# Patient Record
Sex: FEMALE | Race: WHITE | NOT HISPANIC OR LATINO | Employment: UNEMPLOYED | URBAN - METROPOLITAN AREA
[De-identification: names, ages, dates, MRNs, and addresses within clinical notes are randomized per-mention and may not be internally consistent; named-entity substitution may affect disease eponyms.]

---

## 2016-05-19 LAB
ALBUMIN SERPL-MCNC: 4.6 G/DL (ref 3.4–5)
ALP SERPL-CCNC: 71 U/L (ref 0–116)
ALT SERPL-CCNC: 30 U/L (ref 0–78)
ANION GAP SERPL CALCULATED.3IONS-SCNC: NORMAL MMOL/L
AST SERPL-CCNC: 26 U/L (ref 0–37)
BILIRUB SERPL-MCNC: 0.84 MG/DL (ref 0.2–1)
BUN SERPL-MCNC: 18 MG/DL (ref 7–18)
CALCIUM SERPL-MCNC: 9.1 MG/DL (ref 8.5–10.1)
CHLORIDE SERPLBLD-SCNC: 104 MMOL/L (ref 98–107)
CO2 SERPL-SCNC: NORMAL MMOL/L
CREAT SERPL-MCNC: 0.9 MG/DL (ref 0.6–1.3)
GFR SERPL CREATININE-BSD FRML MDRD: NORMAL ML/MIN/1.73M2
GLUCOSE SERPL-MCNC: 86 MG/DL (ref 70–99)
POTASSIUM SERPL-SCNC: 4.6 MMOL/L (ref 3.5–5.1)
PROT SERPL-MCNC: 7.4 G/DL (ref 6.4–8.2)
SODIUM SERPL-SCNC: 144 MMOL/L (ref 136–145)

## 2016-06-30 LAB
CHOLESTEROL, TOTAL: 261 MG/DL (ref 0–200)
HDLC SERPL-MCNC: 100 MG/DL (ref 40–96)
LDL CHOLESTEROL: 145 MG/DL (ref 0–130)
LDL/HDL RATIO: 1.5
TRIGL SERPL-MCNC: 75 MG/DL (ref 30–200)

## 2017-01-05 ENCOUNTER — TELEPHONE (OUTPATIENT)
Dept: LAB | Facility: CLINIC | Age: 62
End: 2017-01-05

## 2017-01-05 NOTE — TELEPHONE ENCOUNTER
Pt calling asking for SBE refill. Informed Pt she has not been seen since 2014. Pt says she was released from care from this clinic. Informed Pt she has a mechanical Aortic valve that should be checked periodically and an OV. Per epic there had been echo and OV order in 2014 per DR Little. Asked Pt if she wanted to schedule appointment she said she was cooking and would call back. OMAR Jones RN

## 2017-01-19 ENCOUNTER — TELEPHONE (OUTPATIENT)
Dept: CARDIOLOGY | Facility: CLINIC | Age: 62
End: 2017-01-19

## 2017-01-24 ENCOUNTER — OFFICE VISIT (OUTPATIENT)
Dept: CARDIOLOGY | Facility: CLINIC | Age: 62
End: 2017-01-24
Payer: COMMERCIAL

## 2017-01-24 VITALS
HEIGHT: 63 IN | DIASTOLIC BLOOD PRESSURE: 64 MMHG | SYSTOLIC BLOOD PRESSURE: 106 MMHG | BODY MASS INDEX: 21.58 KG/M2 | HEART RATE: 62 BPM | WEIGHT: 121.8 LBS

## 2017-01-24 DIAGNOSIS — I35.9 AORTIC VALVE DISORDER: Primary | ICD-10-CM

## 2017-01-24 PROCEDURE — 93000 ELECTROCARDIOGRAM COMPLETE: CPT | Performed by: INTERNAL MEDICINE

## 2017-01-24 PROCEDURE — 99215 OFFICE O/P EST HI 40 MIN: CPT | Mod: 25 | Performed by: INTERNAL MEDICINE

## 2017-01-24 RX ORDER — TAMOXIFEN CITRATE 20 MG/1
TABLET ORAL DAILY
COMMUNITY

## 2017-01-24 NOTE — PROGRESS NOTES
HPI and Plan:   See dictation    Orders Placed This Encounter   Procedures     Follow-Up with Cardiologist     EKG 12-lead complete w/read - Clinics (performed today)     Echocardiogram     Orders Placed This Encounter   Medications     tamoxifen (NOLVADEX) 20 MG tablet     Sig: Take by mouth daily     Medications Discontinued During This Encounter   Medication Reason     calcium 600-200 MG-UNIT TABS      glucosamine-chondroitin 500-400 MG CAPS      GRAPE SEED CR OR      Krill Oil 500 MG CAPS      multivitamin, therapeutic with minerals (MULTI-VITAMIN) TABS      oxyCODONE (ROXICODONE) 5 MG immediate release tablet      VITAMIN D, CHOLECALCIFEROL, PO          Encounter Diagnosis   Name Primary?     Aortic valve disorder Yes       CURRENT MEDICATIONS:  Current Outpatient Prescriptions   Medication Sig Dispense Refill     tamoxifen (NOLVADEX) 20 MG tablet Take by mouth daily       amoxicillin (AMOXIL) 500 MG capsule Take 4 capsules by mouth 1 hour before procedure 4 capsule 4     Lactobacillus Acid-Pectin (LACTOBACILLUS ACIDOPHILUS) TABS Take 1 tablet by mouth daily        acetaminophen (TYLENOL) 325 MG tablet Take 325-650 mg by mouth every 6 hours as needed for mild pain       Warfarin Sodium (COUMADIN PO) Take 5 mg by mouth daily          ALLERGIES   No Known Allergies    PAST MEDICAL HISTORY:  Past Medical History   Diagnosis Date     Breast cancer (H) 2009, 2014(recur)     ER+ MO+ HER-, Tx with surgery, adriamycin/cyclophosphamide x ?4 or 6 cycle,  no XRT     Aortic valve disorders 2010     AVR     Atrial flutter (H)      Antiplatelet or antithrombotic long-term use      coumadin for valve replacement     Numbness and tingling      side affect from chemo       PAST SURGICAL HISTORY:  Past Surgical History   Procedure Laterality Date     Mastectomy, simple rt/lt/[place]  2009     left      Thoracic surgery       2005 lung surg after pneumonia     Hysterectomy total abdominal       Tubal ligation       Insert port  "vascular access  5/6/2014     Procedure: INSERT PORT VASCULAR ACCESS;  Surgeon: Antwan Kraus MD;  Location: [facility]     Replace valve aortic  2010      20 mm ATS mechanical valve     Dissect lymph node axilla Left 10/24/2014     Procedure: DISSECT LYMPH NODE AXILLA;  Surgeon: Antwan Kraus MD;  Location: [facility] OR     Remove port vascular access N/A 10/24/2014     Procedure: REMOVE PORT VASCULAR ACCESS;  Surgeon: Antwan Kraus MD;  Location: [facility] OR     Orthopedic surgery Left 2016     ankle foot, hardware       FAMILY HISTORY:  Family History   Problem Relation Age of Onset     CANCER Mother      lung     CANCER Sister      ovarian      Chronic Obstructive Pulmonary Disease Father      HEART DISEASE Sister      Other - See Comments Brother      suicide       SOCIAL HISTORY:  Social History     Social History     Marital Status:      Spouse Name: N/A     Number of Children: N/A     Years of Education: N/A     Social History Main Topics     Smoking status: Former Smoker -- 1.00 packs/day for 32 years     Start date: 04/30/2007     Smokeless tobacco: Never Used     Alcohol Use: Yes      Comment: 1 per day     Drug Use: No     Sexual Activity: Not Asked     Other Topics Concern     Caffeine Concern Yes     2 cups caffeine per day     Sleep Concern Yes     Weight Concern No     Special Diet No     eats healthy     Back Care No     Exercise Yes     walking daily, water aerobics 3 x weekly      Social History Narrative       Review of Systems:  Skin:  Negative     Eyes:  Positive for tearing  ENT:  Negative    Respiratory:  Negative    Cardiovascular:  Negative    Gastroenterology: Negative    Genitourinary:  Negative    Musculoskeletal:  Negative    Neurologic:  Positive for    Psychiatric:  Positive for sleep disturbances  Heme/Lymph/Imm:  Negative    Endocrine:  Negative      Physical Exam:  Vitals: /64 mmHg  Pulse 62  Ht 1.6 m (5' 3\")  Wt 55.248 kg (121 lb 12.8 oz)  BMI 21.58 " kg/m2    Constitutional:  cooperative, alert and oriented, well developed, well nourished, in no acute distress        Skin:  warm and dry to the touch, no apparent skin lesions or masses noted        Head:  normocephalic, no masses or lesions        Eyes:  pupils equal and round, conjunctivae and lids unremarkable, sclera white, no xanthalasma, EOMS intact, no nystagmus        ENT:  no pallor or cyanosis, dentition good        Neck:  carotid pulses are full and equal bilaterally, JVP normal, no carotid bruit, no thyromegaly        Chest:  normal breath sounds, clear to auscultation, normal A-P diameter, normal symmetry, normal respiratory excursion, no use of accessory muscles          Cardiac: regular rhythm     crisp prosthetic valve sounds systolic ejection murmur;grade 2;radiation to the carotid          Abdomen:  abdomen soft, non-tender, BS normoactive, no mass, no HSM, no bruits        Vascular: pulses full and equal, no bruits auscultated                                        Extremities and Back:  no deformities, clubbing, cyanosis, erythema observed;no edema              Neurological:  affect appropriate, oriented to time, person and place          Recent Lab Results:  LIPID RESULTS:  Lab Results   Component Value Date    .0* 06/30/2016       LIVER ENZYME RESULTS:  Lab Results   Component Value Date    AST 26.0 05/19/2016    ALT 30.0 05/19/2016       CBC RESULTS:  Lab Results   Component Value Date    WBC 10.1 07/23/2010    RBC 4.04 07/23/2010    HGB 12.2 07/23/2010    HCT 37.9 07/23/2010    MCV 94 07/23/2010    MCH 30.2 07/23/2010    MCHC 32.2 07/23/2010    RDW 14.0 07/23/2010     08/02/2010       BMP RESULTS:  Lab Results   Component Value Date    .0 05/19/2016    POTASSIUM 4.6 05/19/2016    CHLORIDE 104.0 05/19/2016    CO2 26 07/23/2010    ANIONGAP 5* 07/23/2010    GLC 86.0 05/19/2016    BUN 18.0 05/19/2016    CR 0.9 05/19/2016    GFRESTIMATED 82 07/23/2010    GFRESTBLACK >90  07/23/2010    HOLLIE 9.1 05/19/2016        A1C RESULTS:  Lab Results   Component Value Date    A1C 5.9 06/25/2010       INR RESULTS:  Lab Results   Component Value Date    INR 0.97 05/06/2014    INR 1.03 08/02/2010    INR 1.3* 06/28/2010    INR 1.0 06/28/2010           CC  Sally Singh MD  [facility]  [address] [place] [address]

## 2017-01-24 NOTE — Clinical Note
2017      Sally Singh MD    [facility]    [address] [place]  [address]       Arabella Sandoval MD    [facility]    [address], [address]    [place]  [address]       RE: Venecia Atkinson   MRN: [id number]   : 1955      Dear Doctors:      I had the pleasure of following up on our mutual patient, Venecia Atkinson.  She is a delightful 61-year-old woman who looks much younger and acts much younger than stated age.  I have not seen her since .  I was her cardiologist when she had her aortic valve replacement with a metallic valve.  She subsequently was diagnosed with cancer and was seen at our Cardiology Oncology clinic by Dr. Little.  Her last visit was in .  She had ER positive, ND positive, HER negative left-sided breast cancer and then had recurrence.  She did receive, I believe, 4 cycles (the patient thought it was 6 cycles) of Adriamycin and cyclophosphamide.  She is currently on tamoxifen.  She had surgery but no radiation.  Her last echo was back in .  She had normal ejection fraction, normal strain and her prosthetic aortic valve was working well.  The patient reports no cardiovascular complaints at all.  At the time of her aortic valve surgery, which was back in , she had trivial coronary disease.  I bring this up because she has a very good HDL but she also has an elevated LDL.  I went through the risk calculator with her today.  Her estimated 10-year risk of a significant cardiovascular event in the next 10 years is about 2.3%.  Therefore, at this point, even though the LDL is high, I am not going to recommend statin as she does not have any other risk factors.  In addition, she is not particularly enamored with the idea of taking a statin.  At this juncture, then, I think we can wait.  As she ages, though, we should recalculate or if she develops any additional conditions, we should recalculate the  risk/benefit ratio of a statin.  From a cardiac standpoint, I would like to get another echo looking for strain and an ejection fraction and assess her aortic valve, and we can do that later this year.  If her internist, Dr. Singh, would kindly draw another lipid panel, since by that time 1 year would have gone by, I would like to see the trend in the LDL before making any final decisions.  All of this was discussed with the patient and over the course of this 45-minute consult, greater than 50% counseling.              Thank you for allowing me to see this kind woman.      Sincerely,            Micha Singh MD

## 2017-01-25 NOTE — PROGRESS NOTES
2017      Sally Singh MD    [facility]    [address] [place]  [address]       Arabella Sandoval MD    [facility]    [address], [address]    [place]  [address]       RE: Venecia Atkinson   MRN: [id number]   : 1955      Dear Doctors:      I had the pleasure of following up on our mutual patient, Venecia Atkinson.  She is a delightful 61-year-old woman who looks much younger and acts much younger than stated age.  I have not seen her since .  I was her cardiologist when she had her aortic valve replacement with a metallic valve.  She subsequently was diagnosed with cancer and was seen at our Cardiology Oncology clinic by Dr. Little.  Her last visit was in .  She had ER positive, KS positive, HER negative left-sided breast cancer and then had recurrence.  She did receive, I believe, 4 cycles (the patient thought it was 6 cycles) of Adriamycin and cyclophosphamide.  She is currently on tamoxifen.  She had surgery but no radiation.  Her last echo was back in .  She had normal ejection fraction, normal strain and her prosthetic aortic valve was working well.  The patient reports no cardiovascular complaints at all.  At the time of her aortic valve surgery, which was back in , she had trivial coronary disease.  I bring this up because she has a very good HDL but she also has an elevated LDL.  I went through the risk calculator with her today.  Her estimated 10-year risk of a significant cardiovascular event in the next 10 years is about 2.3%.  Therefore, at this point, even though the LDL is high, I am not going to recommend statin as she does not have any other risk factors.  In addition, she is not particularly enamored with the idea of taking a statin.  At this juncture, then, I think we can wait.  As she ages, though, we should recalculate or if she develops any additional conditions, we should recalculate the  risk/benefit ratio of a statin.  From a cardiac standpoint, I would like to get another echo looking for strain and an ejection fraction and assess her aortic valve, and we can do that later this year.  If her internist, Dr. Singh, would kindly draw another lipid panel, since by that time 1 year would have gone by, I would like to see the trend in the LDL before making any final decisions.  All of this was discussed with the patient and over the course of this 45-minute consult, greater than 50% counseling.      Thank you for allowing me to see this kind woman.      Sincerely,            MD BRIANNA Rosas MD             D: 2017 13:38   T: 2017 20:23   MT: MARIELA      Name:     CAROLEE SMITH   MRN:      [id number]        Account:      [id number]   :      1955           Service Date: 2017      Document: [id number]

## 2017-05-08 ENCOUNTER — HOSPITAL ENCOUNTER (OUTPATIENT)
Dept: MAMMOGRAPHY | Facility: CLINIC | Age: 62
Discharge: HOME | End: 2017-05-08
Attending: INTERNAL MEDICINE | Admitting: INTERNAL MEDICINE
Payer: COMMERCIAL

## 2017-05-08 DIAGNOSIS — Z12.31 VISIT FOR SCREENING MAMMOGRAM: ICD-10-CM

## 2017-05-08 PROCEDURE — G0202 SCR MAMMO BI INCL CAD: HCPCS | Mod: 52

## 2017-07-25 DIAGNOSIS — E78.5 HYPERLIPIDEMIA LDL GOAL <130: Primary | ICD-10-CM

## 2017-07-26 ENCOUNTER — OFFICE VISIT (OUTPATIENT)
Dept: CARDIOLOGY | Facility: CLINIC | Age: 62
End: 2017-07-26
Attending: INTERNAL MEDICINE
Payer: COMMERCIAL

## 2017-07-26 ENCOUNTER — HOSPITAL ENCOUNTER (OUTPATIENT)
Dept: CARDIOLOGY | Facility: CLINIC | Age: 62
Discharge: HOME | End: 2017-07-26
Attending: INTERNAL MEDICINE | Admitting: INTERNAL MEDICINE
Payer: COMMERCIAL

## 2017-07-26 VITALS
BODY MASS INDEX: 21.25 KG/M2 | SYSTOLIC BLOOD PRESSURE: 124 MMHG | HEIGHT: 63 IN | DIASTOLIC BLOOD PRESSURE: 68 MMHG | WEIGHT: 119.9 LBS | HEART RATE: 62 BPM

## 2017-07-26 DIAGNOSIS — Q23.1 CONGENITAL INSUFFICIENCY OF AORTIC VALVE: Primary | ICD-10-CM

## 2017-07-26 DIAGNOSIS — Z92.21 STATUS POST CHEMOTHERAPY: ICD-10-CM

## 2017-07-26 DIAGNOSIS — E78.5 HYPERLIPIDEMIA LDL GOAL <130: ICD-10-CM

## 2017-07-26 DIAGNOSIS — I35.9 AORTIC VALVE DISORDER: ICD-10-CM

## 2017-07-26 LAB
ALT SERPL W P-5'-P-CCNC: <5 U/L (ref 5–30)
CHOLEST SERPL-MCNC: 184 MG/DL
HDLC SERPL-MCNC: 78 MG/DL
LDLC SERPL CALC-MCNC: 90 MG/DL
NONHDLC SERPL-MCNC: 106 MG/DL
TRIGL SERPL-MCNC: 78 MG/DL

## 2017-07-26 PROCEDURE — 93306 TTE W/DOPPLER COMPLETE: CPT | Mod: 26 | Performed by: INTERNAL MEDICINE

## 2017-07-26 PROCEDURE — 93306 TTE W/DOPPLER COMPLETE: CPT

## 2017-07-26 PROCEDURE — 36415 COLL VENOUS BLD VENIPUNCTURE: CPT | Performed by: INTERNAL MEDICINE

## 2017-07-26 PROCEDURE — 84460 ALANINE AMINO (ALT) (SGPT): CPT | Performed by: INTERNAL MEDICINE

## 2017-07-26 PROCEDURE — 99213 OFFICE O/P EST LOW 20 MIN: CPT | Mod: 25 | Performed by: INTERNAL MEDICINE

## 2017-07-26 PROCEDURE — 80061 LIPID PANEL: CPT | Performed by: INTERNAL MEDICINE

## 2017-07-26 NOTE — PROGRESS NOTES
HPI and Plan:   See dictation    Orders Placed This Encounter   Procedures     Follow-Up with Cardiologist     Echocardiogram     No orders of the defined types were placed in this encounter.    There are no discontinued medications.      Encounter Diagnoses   Name Primary?     Aortic valve disorder      Bicuspid aortic valve Yes     Atrial flutter (H)      Status post chemotherapy        CURRENT MEDICATIONS:  Current Outpatient Prescriptions   Medication Sig Dispense Refill     tamoxifen (NOLVADEX) 20 MG tablet Take by mouth daily       amoxicillin (AMOXIL) 500 MG capsule Take 4 capsules by mouth 1 hour before procedure 4 capsule 4     Lactobacillus Acid-Pectin (LACTOBACILLUS ACIDOPHILUS) TABS Take 1 tablet by mouth daily        acetaminophen (TYLENOL) 325 MG tablet Take 325-650 mg by mouth every 6 hours as needed for mild pain       Warfarin Sodium (COUMADIN PO) Take 5 mg by mouth daily          ALLERGIES   No Known Allergies    PAST MEDICAL HISTORY:  Past Medical History:   Diagnosis Date     Antiplatelet or antithrombotic long-term use     coumadin for valve replacement     Aortic valve disorders 2010    AVR     Atrial flutter (H)      Breast cancer (H) 2009, 2014(recur)    ER+ HI+ HER-, Tx with surgery, adriamycin/cyclophosphamide x ?4 or 6 cycle,  no XRT     Numbness and tingling     side affect from chemo       PAST SURGICAL HISTORY:  Past Surgical History:   Procedure Laterality Date     DISSECT LYMPH NODE AXILLA Left 10/24/2014    Procedure: DISSECT LYMPH NODE AXILLA;  Surgeon: Antwan Kraus MD;  Location: [facility] OR     HYSTERECTOMY TOTAL ABDOMINAL       INSERT PORT VASCULAR ACCESS  5/6/2014    Procedure: INSERT PORT VASCULAR ACCESS;  Surgeon: Antwan Kraus MD;  Location: [facility] OR     MASTECTOMY, SIMPLE RT/LT/ARIAN  2009    left      ORTHOPEDIC SURGERY Left 2016    ankle foot, hardware     REMOVE PORT VASCULAR ACCESS N/A 10/24/2014    Procedure: REMOVE PORT VASCULAR ACCESS;  Surgeon: Antwan Kraus MD;   "Location: [facility] OR     REPLACE VALVE AORTIC  2010     20 mm ATS mechanical valve     THORACIC SURGERY      2005 lung surg after pneumonia     TUBAL LIGATION         FAMILY HISTORY:  Family History   Problem Relation Age of Onset     CANCER Mother      lung     CANCER Sister      ovarian      Chronic Obstructive Pulmonary Disease Father      HEART DISEASE Sister      Other - See Comments Brother      suicide       SOCIAL HISTORY:  Social History     Social History     Marital status:      Spouse name: N/A     Number of children: N/A     Years of education: N/A     Social History Main Topics     Smoking status: Former Smoker     Packs/day: 1.00     Years: 32.00     Start date: 4/30/2007     Smokeless tobacco: Never Used     Alcohol use Yes      Comment: 1 per day     Drug use: No     Sexual activity: Not Asked     Other Topics Concern     Caffeine Concern Yes     2 cups caffeine per day     Sleep Concern Yes     Weight Concern No     Special Diet No     eats healthy     Back Care No     Exercise Yes     walking daily, water aerobics 3 x weekly      Social History Narrative       Review of Systems:  Skin:  Negative     Eyes:  Positive for tearing  ENT:  Negative    Respiratory:  Negative    Cardiovascular:  Negative Positive for;dizziness  Gastroenterology: Negative    Genitourinary:  Negative    Musculoskeletal:  Negative    Neurologic:  Positive for    Psychiatric:  Positive for sleep disturbances  Heme/Lymph/Imm:  Negative    Endocrine:  Negative      Physical Exam:  Vitals: /68  Pulse 62  Ht 1.6 m (5' 2.99\")  Wt 54.4 kg (119 lb 14.4 oz)  BMI 21.24 kg/m2    Constitutional:           Skin:           Head:           Eyes:           ENT:           Neck:           Chest:             Cardiac:                    Abdomen:           Vascular:                                          Extremities and Back:                 Neurological:             Recent Lab Results:  LIPID RESULTS:  Lab Results   Component " Value Date    CHOL 184 07/26/2017    HDL 78 07/26/2017    LDL 90 07/26/2017    TRIG 78 07/26/2017       LIVER ENZYME RESULTS:  Lab Results   Component Value Date    AST 26.0 05/19/2016    ALT <5 (L) 07/26/2017       CBC RESULTS:  Lab Results   Component Value Date    WBC 10.1 07/23/2010    RBC 4.04 07/23/2010    HGB 12.2 07/23/2010    HCT 37.9 07/23/2010    MCV 94 07/23/2010    MCH 30.2 07/23/2010    MCHC 32.2 07/23/2010    RDW 14.0 07/23/2010     08/02/2010       BMP RESULTS:  Lab Results   Component Value Date    .0 05/19/2016    POTASSIUM 4.6 05/19/2016    CHLORIDE 104.0 05/19/2016    CO2 26 07/23/2010    ANIONGAP 5 (L) 07/23/2010    GLC 86.0 05/19/2016    BUN 18.0 05/19/2016    CR 0.9 05/19/2016    GFRESTIMATED 82 07/23/2010    GFRESTBLACK >90 07/23/2010    HOLLIE 9.1 05/19/2016        A1C RESULTS:  Lab Results   Component Value Date    A1C 5.9 06/25/2010       INR RESULTS:  Lab Results   Component Value Date    INR 0.97 05/06/2014    INR 1.03 08/02/2010           CC  Micha Singh MD   [facility]  [address]  [place] [id number]

## 2017-07-26 NOTE — PROGRESS NOTES
Date of service:  07/26/2017    Dear Doctors,     I had the pleasure of following up on our mutual patient, Venecia Atkinson.  She is a delightful, fit 62-year-old woman I saw originally for aortic valve replacement back in 2010.  She had brief atrial flutter at the time of surgery.  Subsequently she developed breast cancer and she did receive Adriamycin amongst other drugs and surgery but no radiation therapy.  We have done serial echocardiograms to make sure she does not have Adriamycin cardiomyopathy.  I am happy to report her current echocardiogram again shows normal ejection fraction, EF greater than 60%.  Her strain is negative 22% which is in the normal range.  She is currently on tamoxifen which although might be slightly increasing her risk of clot, she is on Coumadin for a mechanical valve, seems to be doing quite well.  She feels well with no cardiovascular complaints whatsoever.  Also her LDL cholesterol is very high when I last saw her, it turns out she was on coconut oil and she is off that and now her cholesterol numbers are in the normal range.  Her LDL is in the 90s, HDL is quite good.  At this juncture, I think she is doing well.  I am going to ask her to come back in 2 years for repeat echo just to look for any late effects of the chemotherapy and continue to watch her prosthetic aortic valve.  She will call us if there is a problem that should pop up between now and then.      Total consult time is 15 minutes, all counseling to review the above tests.        Micha Singh MD    D:  07/26/2017 15:56 T:  07/26/2017 17:17  Document:  [id number] \CD    cc: MD Arabella Cardona MD

## 2017-07-26 NOTE — LETTER
7/26/2017    Sally Singh MD  [facility]   [address]  [facility] [address]    RE: Venecia Atkinson       Dear Colleague,    I had the pleasure of seeing Venecia Atkinson in the [facility].    HPI and Plan:   See dictation    Orders Placed This Encounter   Procedures     Follow-Up with Cardiologist     Echocardiogram     No orders of the defined types were placed in this encounter.    There are no discontinued medications.      Encounter Diagnoses   Name Primary?     Aortic valve disorder      Bicuspid aortic valve Yes     Atrial flutter (H)      Status post chemotherapy        CURRENT MEDICATIONS:  Current Outpatient Prescriptions   Medication Sig Dispense Refill     tamoxifen (NOLVADEX) 20 MG tablet Take by mouth daily       amoxicillin (AMOXIL) 500 MG capsule Take 4 capsules by mouth 1 hour before procedure 4 capsule 4     Lactobacillus Acid-Pectin (LACTOBACILLUS ACIDOPHILUS) TABS Take 1 tablet by mouth daily        acetaminophen (TYLENOL) 325 MG tablet Take 325-650 mg by mouth every 6 hours as needed for mild pain       Warfarin Sodium (COUMADIN PO) Take 5 mg by mouth daily          ALLERGIES   No Known Allergies    PAST MEDICAL HISTORY:  Past Medical History:   Diagnosis Date     Antiplatelet or antithrombotic long-term use     coumadin for valve replacement     Aortic valve disorders 2010    AVR     Atrial flutter (H)      Breast cancer (H) 2009, 2014(recur)    ER+ [place]+ HER-, Tx with surgery, adriamycin/cyclophosphamide x ?4 or 6 cycle,  no XRT     Numbness and tingling     side affect from chemo       PAST SURGICAL HISTORY:  Past Surgical History:   Procedure Laterality Date     DISSECT LYMPH NODE AXILLA Left 10/24/2014    Procedure: DISSECT LYMPH NODE AXILLA;  Surgeon: Antwan Kraus MD;  Location: [facility] OR     HYSTERECTOMY TOTAL ABDOMINAL       INSERT PORT VASCULAR ACCESS  5/6/2014    Procedure: INSERT PORT VASCULAR ACCESS;  Surgeon: Antwan Kraus  "MD HAROON;  Location: [facility] OR     MASTECTOMY, SIMPLE RT/LT/ARIAN  2009    left      ORTHOPEDIC SURGERY Left 2016    ankle foot, hardware     REMOVE PORT VASCULAR ACCESS N/A 10/24/2014    Procedure: REMOVE PORT VASCULAR ACCESS;  Surgeon: Antwan Kraus MD;  Location: [facility] OR     REPLACE VALVE AORTIC  2010     20 mm ATS mechanical valve     THORACIC SURGERY      2005 lung surg after pneumonia     TUBAL LIGATION         FAMILY HISTORY:  Family History   Problem Relation Age of Onset     CANCER Mother      lung     CANCER Sister      ovarian      Chronic Obstructive Pulmonary Disease Father      HEART DISEASE Sister      Other - See Comments Brother      suicide       SOCIAL HISTORY:  Social History     Social History     Marital status:      Spouse name: N/A     Number of children: N/A     Years of education: N/A     Social History Main Topics     Smoking status: Former Smoker     Packs/day: 1.00     Years: 32.00     Start date: 4/30/2007     Smokeless tobacco: Never Used     Alcohol use Yes      Comment: 1 per day     Drug use: No     Sexual activity: Not Asked     Other Topics Concern     Caffeine Concern Yes     2 cups caffeine per day     Sleep Concern Yes     Weight Concern No     Special Diet No     eats healthy     Back Care No     Exercise Yes     walking daily, water aerobics 3 x weekly      Social History Narrative       Review of Systems:  Skin:  Negative     Eyes:  Positive for tearing  ENT:  Negative    Respiratory:  Negative    Cardiovascular:  Negative Positive for;dizziness  Gastroenterology: Negative    Genitourinary:  Negative    Musculoskeletal:  Negative    Neurologic:  Positive for    Psychiatric:  Positive for sleep disturbances  Heme/Lymph/Imm:  Negative    Endocrine:  Negative      Physical Exam:  Vitals: /68  Pulse 62  Ht 1.6 m (5' 2.99\")  Wt 54.4 kg (119 lb 14.4 oz)  BMI 21.24 kg/m2    Constitutional:           Skin:           Head:           Eyes:           ENT:           Neck:       "     Chest:             Cardiac:                    Abdomen:           Vascular:                                          Extremities and Back:                 Neurological:             Recent Lab Results:  LIPID RESULTS:  Lab Results   Component Value Date    CHOL 184 07/26/2017    HDL 78 07/26/2017    LDL 90 07/26/2017    TRIG 78 07/26/2017       LIVER ENZYME RESULTS:  Lab Results   Component Value Date    AST 26.0 05/19/2016    ALT <5 (L) 07/26/2017       CBC RESULTS:  Lab Results   Component Value Date    WBC 10.1 07/23/2010    RBC 4.04 07/23/2010    HGB 12.2 07/23/2010    HCT 37.9 07/23/2010    MCV 94 07/23/2010    MCH 30.2 07/23/2010    MCHC 32.2 07/23/2010    RDW 14.0 07/23/2010     08/02/2010       BMP RESULTS:  Lab Results   Component Value Date    .0 05/19/2016    POTASSIUM 4.6 05/19/2016    CHLORIDE 104.0 05/19/2016    CO2 26 07/23/2010    ANIONGAP 5 (L) 07/23/2010    GLC 86.0 05/19/2016    BUN 18.0 05/19/2016    CR 0.9 05/19/2016    GFRESTIMATED 82 07/23/2010    GFRESTBLACK >90 07/23/2010    HOLLIE 9.1 05/19/2016        A1C RESULTS:  Lab Results   Component Value Date    A1C 5.9 06/25/2010       INR RESULTS:  Lab Results   Component Value Date    INR 0.97 05/06/2014    INR 1.03 08/02/2010           CC  Micha Singh MD  [facility]  [address]  [place] [address]      Date of service:  07/26/2017    Dear Doctors,     I had the pleasure of following up on our mutual patient, Venecia Atkinson.  She is a delightful, fit 62-year-old woman I saw originally for aortic valve replacement back in 2010.  She had brief atrial flutter at the time of surgery.  Subsequently she developed breast cancer and she did receive Adriamycin amongst other drugs and surgery but no radiation therapy.  We have done serial echocardiograms to make sure she does not have Adriamycin cardiomyopathy.  I am happy to report her current echocardiogram again shows normal ejection fraction, EF greater than 60%.   Her strain is negative 22% which is in the normal range.  She is currently on tamoxifen which although might be slightly increasing her risk of clot, she is on Coumadin for a mechanical valve, seems to be doing quite well.  She feels well with no cardiovascular complaints whatsoever.  Also her LDL cholesterol is very high when I last saw her, it turns out she was on coconut oil and she is off that and now her cholesterol numbers are in the normal range.  Her LDL is in the 90s, HDL is quite good.  At this juncture, I think she is doing well.  I am going to ask her to come back in 2 years for repeat echo just to look for any late effects of the chemotherapy and continue to watch her prosthetic aortic valve.  She will call us if there is a problem that should pop up between now and then.      Total consult time is 15 minutes, all counseling to review the above tests.        Micha Singh MD    D:  07/26/2017 15:56 T:  07/26/2017 17:17  Document:  [id number] \CD    cc: MD Arabella Cardona MD    Thank you for allowing me to participate in the care of your patient.    Sincerely,     Micha Singh MD     [facility]

## 2017-07-26 NOTE — MR AVS SNAPSHOT
"              After Visit Summary   7/26/2017    Venecia Atkinson    MRN: [id number]           Patient Information     Date Of Birth          1955        Visit Information        Provider Department      7/26/2017 2:45 PM Micha Singh MD [facility] [place]        Today's Diagnoses     Bicuspid aortic valve    -  1    Aortic valve disorder        Status post chemotherapy           Follow-ups after your visit        Additional Services     Follow-Up with Cardiologist                 Future tests that were ordered for you today     Open Future Orders        Priority Expected Expires Ordered    Echocardiogram Routine 7/26/2019 8/15/2019 7/26/2017    Follow-Up with Cardiologist Routine 7/26/2019 8/15/2019 7/26/2017            Who to contact     If you have questions or need follow up information about today's clinic visit or your schedule please contact [facility] directly at [phone].  Normal or non-critical lab and imaging results will be communicated to you by MyChart, letter or phone within 4 business days after the clinic has received the results. If you do not hear from us within 7 days, please contact the clinic through [employer] or phone. If you have a critical or abnormal lab result, we will notify you by phone as soon as possible.  Submit refill requests through [employer] or call your pharmacy and they will forward the refill request to us. Please allow 3 business days for your refill to be completed.          Additional Information About Your Visit        MyChart Information     [employer] lets you send messages to your doctor, view your test results, renew your prescriptions, schedule appointments and more. To sign up, go to [email or web address] . Click on \"Log in\" on the left side of the screen, which will take you to the Welcome page. Then click on \"Sign up Now\" on the right side of the page.     You will be asked to " "enter the access code listed below, as well as some personal information. Please follow the directions to create your username and password.     Your access code is: [email or web address]  Expires: 10/24/2017  2:57 PM     Your access code will  in 90 days. If you need help or a new code, please call your [facility] clinic or [phone].        Care EveryWhere ID     This is your Care EveryWhere ID. This could be used by other organizations to access your [facility] medical records  [id number]        Your Vitals Were     Pulse Height BMI (Body Mass Index)             62 1.6 m (5' 2.99\") 21.24 kg/m2          Blood Pressure from Last 3 Encounters:   17 124/68   17 106/64   10/24/14 99/66    Weight from Last 3 Encounters:   17 54.4 kg (119 lb 14.4 oz)   17 55.2 kg (121 lb 12.8 oz)   10/24/14 55.8 kg (123 lb)              We Performed the Following     Follow-Up with Cardiologist        Primary Care Provider Office Phone # Fax #    Sally Singh [phone]       [facility] [address] [facility] [address]        Equal Access to Services     HERMAN FAY : Hadii augusto johnsono Soalbert, waaxda luqadaha, qaybta kaalmada adeegyada, eli english . So [facility] [phone].    ATENCIÓN: Si habla español, tiene a canela disposición servicios gratuitos de asistencia lingüística. Llame al [phone].    We comply with applicable federal civil rights laws and [place] laws. We do not discriminate on the basis of race, color, national origin, age, disability sex, sexual orientation or gender identity.            Thank you!     Thank you for choosing [facility] [place]  for your care. Our goal is always to provide you with excellent care. Hearing back from our patients is one way we can continue to improve our services. Please take a few minutes to complete the written survey that you may receive in the mail " after your visit with us. Thank you!             Your Updated Medication List - Protect others around you: Learn how to safely use, store and throw away your medicines at www.disposemymeds.org.          This list is accurate as of: 7/26/17  2:57 PM.  Always use your most recent med list.                   Brand Name Dispense Instructions for use Diagnosis    amoxicillin 500 MG capsule    AMOXIL    4 capsule    Take 4 capsules by mouth 1 hour before procedure    S/P AVR       COUMADIN PO      Take 5 mg by mouth daily        lactobacillus acidophilus Tabs      Take 1 tablet by mouth daily        tamoxifen 20 MG tablet    NOLVADEX     Take by mouth daily        TYLENOL 325 MG tablet   Generic drug:  acetaminophen      Take 325-650 mg by mouth every 6 hours as needed for mild pain

## 2017-08-14 NOTE — PROGRESS NOTES
Date of service:  07/26/2017    Dear Doctors,     I had the pleasure of following up on our mutual patient, Venecia Atkinson.  She is a delightful, fit 62-year-old woman I saw originally for aortic valve replacement back in 2010.  She had brief atrial flutter at the time of surgery.  Subsequently she developed breast cancer and she did receive Adriamycin amongst other drugs and surgery but no radiation therapy.  We have done serial echocardiograms to make sure she does not have Adriamycin cardiomyopathy.  I am happy to report her current echocardiogram again shows normal ejection fraction, EF greater than 60%.  Her strain is negative 22% which is in the normal range.  She is currently on tamoxifen which although might be slightly increasing her risk of clot, she is on Coumadin for a mechanical valve, seems to be doing quite well.  She feels well with no cardiovascular complaints whatsoever.  Also her LDL cholesterol is very high when I last saw her, it turns out she was on coconut oil and she is off that and now her cholesterol numbers are in the normal range.  Her LDL is in the 90s, HDL is quite good.  At this juncture, I think she is doing well.  I am going to ask her to come back in 2 years for repeat echo just to look for any late effects of the chemotherapy and continue to watch her prosthetic aortic valve.  She will call us if there is a problem that should pop up between now and then.      Total consult time is 15 minutes, all counseling to review the above tests.        Micha Singh MD    D:  07/26/2017 15:56 T:  07/26/2017 17:17  Document:  [id number] \CD    cc: MD Arabella Cardona MD

## 2018-02-28 DIAGNOSIS — Z95.2 S/P AVR: ICD-10-CM

## 2018-02-28 RX ORDER — AMOXICILLIN 500 MG/1
CAPSULE ORAL
Qty: 4 CAPSULE | Refills: 4 | Status: SHIPPED | OUTPATIENT
Start: 2018-02-28 | End: 2019-03-26

## 2018-05-09 ENCOUNTER — HOSPITAL ENCOUNTER (OUTPATIENT)
Dept: MAMMOGRAPHY | Facility: CLINIC | Age: 63
Discharge: HOME | End: 2018-05-09
Attending: INTERNAL MEDICINE | Admitting: INTERNAL MEDICINE
Payer: COMMERCIAL

## 2018-05-09 DIAGNOSIS — Z85.3 ENCOUNTER FOR FOLLOW-UP SURVEILLANCE OF BREAST CANCER: ICD-10-CM

## 2018-05-09 DIAGNOSIS — Z08 ENCOUNTER FOR FOLLOW-UP SURVEILLANCE OF BREAST CANCER: ICD-10-CM

## 2018-05-09 PROCEDURE — 77063 BREAST TOMOSYNTHESIS BI: CPT | Mod: 52

## 2019-03-26 ENCOUNTER — TELEPHONE (OUTPATIENT)
Dept: CARDIOLOGY | Facility: CLINIC | Age: 64
End: 2019-03-26

## 2019-03-26 DIAGNOSIS — Z95.2 S/P AVR: ICD-10-CM

## 2019-03-26 RX ORDER — AMOXICILLIN 500 MG/1
CAPSULE ORAL
Qty: 4 CAPSULE | Refills: 4 | Status: SHIPPED | OUTPATIENT
Start: 2019-03-26 | End: 2020-04-30

## 2019-03-26 NOTE — TELEPHONE ENCOUNTER
Call out to Pt to discuss amoxicillin refills for aortic valve, and F/U with cardiology. OMAR Jones RN

## 2019-05-10 ENCOUNTER — HOSPITAL ENCOUNTER (OUTPATIENT)
Dept: MAMMOGRAPHY | Facility: CLINIC | Age: 64
Discharge: HOME | End: 2019-05-10
Attending: INTERNAL MEDICINE | Admitting: INTERNAL MEDICINE
Payer: COMMERCIAL

## 2019-05-10 DIAGNOSIS — Z12.31 VISIT FOR SCREENING MAMMOGRAM: ICD-10-CM

## 2019-05-10 PROCEDURE — 77067 SCR MAMMO BI INCL CAD: CPT | Mod: 52

## 2019-05-13 ENCOUNTER — TRANSFERRED RECORDS (OUTPATIENT)
Dept: HEALTH INFORMATION MANAGEMENT | Facility: CLINIC | Age: 64
End: 2019-05-13

## 2019-07-26 ENCOUNTER — TELEPHONE (OUTPATIENT)
Dept: CARDIOLOGY | Facility: CLINIC | Age: 64
End: 2019-07-26

## 2019-07-26 ENCOUNTER — HOSPITAL ENCOUNTER (OUTPATIENT)
Dept: CARDIOLOGY | Facility: CLINIC | Age: 64
Discharge: HOME | End: 2019-07-26
Attending: INTERNAL MEDICINE | Admitting: INTERNAL MEDICINE
Payer: COMMERCIAL

## 2019-07-26 DIAGNOSIS — I35.9 AORTIC VALVE DISORDER: ICD-10-CM

## 2019-07-26 DIAGNOSIS — Z92.21 STATUS POST CHEMOTHERAPY: ICD-10-CM

## 2019-07-26 PROCEDURE — 93306 TTE W/DOPPLER COMPLETE: CPT | Mod: 26 | Performed by: INTERNAL MEDICINE

## 2019-07-26 PROCEDURE — 93306 TTE W/DOPPLER COMPLETE: CPT

## 2019-07-26 NOTE — TELEPHONE ENCOUNTER
Patient has scheduled f/u apt with Dr. Singh on 9/18/19. RN will send to Dr. Singh for review if any further testing needed prior to OV        7/26/19 ECHO results  Interpretation Summary     1. The left ventricle is normal in structure, function and size. The visual  ejection fraction is estimated at 65%.  2. Global peak LV longitudinal strain is averaged at -22%. This is within  reported normal limits (normal <-18%).  3. The right ventricle is normal in structure, function and size.  4. There is a mechanical aortic valve. Mean 16mmHg, Vmax 2.8m/s, DI 0.39.  Normal gradient for valve. Trace/physiologic AI.     Echo from 7/2017 showed EF 68%, strain -22%, AV with mean 18mmHg.

## 2019-08-07 NOTE — TELEPHONE ENCOUNTER
RN called patient and left VM with ECHO results and advised her to call clinic with any questions prior to her upcoming OV with Dr. Singh on 9/18/19.

## 2019-09-18 ENCOUNTER — OFFICE VISIT (OUTPATIENT)
Dept: CARDIOLOGY | Facility: CLINIC | Age: 64
End: 2019-09-18
Payer: COMMERCIAL

## 2019-09-18 VITALS
WEIGHT: 122 LBS | HEART RATE: 65 BPM | SYSTOLIC BLOOD PRESSURE: 126 MMHG | DIASTOLIC BLOOD PRESSURE: 78 MMHG | BODY MASS INDEX: 21.62 KG/M2 | HEIGHT: 63 IN

## 2019-09-18 DIAGNOSIS — I35.9 AORTIC VALVE DISORDER: Primary | ICD-10-CM

## 2019-09-18 PROCEDURE — 99213 OFFICE O/P EST LOW 20 MIN: CPT | Performed by: INTERNAL MEDICINE

## 2019-09-18 ASSESSMENT — MIFFLIN-ST. JEOR: SCORE: 1077.52

## 2019-09-18 NOTE — PROGRESS NOTES
HPI and Plan:   See dictation    Orders Placed This Encounter   Procedures     Follow-Up with Cardiologist     No orders of the defined types were placed in this encounter.    There are no discontinued medications.      Encounter Diagnosis   Name Primary?     Aortic valve disorder Yes       CURRENT MEDICATIONS:  Current Outpatient Medications   Medication Sig Dispense Refill     acetaminophen (TYLENOL) 325 MG tablet Take 325-650 mg by mouth every 6 hours as needed for mild pain       amoxicillin (AMOXIL) 500 MG capsule Take 4 capsules by mouth 1 hour before procedure 4 capsule 4     Lactobacillus Acid-Pectin (LACTOBACILLUS ACIDOPHILUS) TABS Take 1 tablet by mouth daily        tamoxifen (NOLVADEX) 20 MG tablet Take by mouth daily       Warfarin Sodium (COUMADIN PO) Take 5 mg by mouth daily          ALLERGIES   No Known Allergies    PAST MEDICAL HISTORY:  Past Medical History:   Diagnosis Date     Antiplatelet or antithrombotic long-term use     coumadin for valve replacement     Aortic valve disorders 2010    AVR     Atrial flutter (H)      Breast cancer (H) 2009, 2014(recur)    ER+ AK+ HER-, Tx with surgery, adriamycin/cyclophosphamide x ?4 or 6 cycle,  no XRT     Numbness and tingling     side affect from chemo       PAST SURGICAL HISTORY:  Past Surgical History:   Procedure Laterality Date     DISSECT LYMPH NODE AXILLA Left 10/24/2014    Procedure: DISSECT LYMPH NODE AXILLA;  Surgeon: Antwan Kraus MD;  Location: [facility] OR     HYSTERECTOMY TOTAL ABDOMINAL       INSERT PORT VASCULAR ACCESS  5/6/2014    Procedure: INSERT PORT VASCULAR ACCESS;  Surgeon: Antwan Kraus MD;  Location: [facility] OR     MASTECTOMY, SIMPLE RT/LT/ARIAN  2009    left      ORTHOPEDIC SURGERY Left 2016    ankle foot, hardware     REMOVE PORT VASCULAR ACCESS N/A 10/24/2014    Procedure: REMOVE PORT VASCULAR ACCESS;  Surgeon: Antwan Kraus MD;  Location: [facility] OR     REPLACE VALVE AORTIC  2010     20 mm ATS mechanical valve     THORACIC SURGERY       2005 lung surg after pneumonia     TUBAL LIGATION         FAMILY HISTORY:  Family History   Problem Relation Age of Onset     Cancer Mother         lung     Cancer Sister         ovarian      Chronic Obstructive Pulmonary Disease Father      Heart Disease Sister      Other - See Comments Brother         suicide       SOCIAL HISTORY:  Social History     Socioeconomic History     Marital status:      Spouse name: None     Number of children: None     Years of education: None     Highest education level: None   Occupational History     None   Social Needs     Financial resource strain: None     Food insecurity:     Worry: None     Inability: None     Transportation needs:     Medical: None     Non-medical: None   Tobacco Use     Smoking status: Former Smoker     Packs/day: 1.00     Years: 32.00     Pack years: 32.00     Start date: 4/30/2007     Smokeless tobacco: Never Used   Substance and Sexual Activity     Alcohol use: Yes     Comment: 1 per day     Drug use: No     Sexual activity: None   Lifestyle     Physical activity:     Days per week: None     Minutes per session: None     Stress: None   Relationships     Social connections:     Talks on phone: None     Gets together: None     Attends [employer]ism service: None     Active member of club or organization: None     Attends meetings of clubs or organizations: None     Relationship status: None     Intimate partner violence:     Fear of current or ex partner: None     Emotionally abused: None     Physically abused: None     Forced sexual activity: None   Other Topics Concern     Parent/sibling w/ CABG, MI or angioplasty before 65F 55M? Not Asked      Service Not Asked     Blood Transfusions Not Asked     Caffeine Concern Yes     Comment: 2 cups caffeine per day     Occupational Exposure Not Asked     Hobby Hazards Not Asked     Sleep Concern Yes     Stress Concern Not Asked     Weight Concern No     Special Diet No     Comment: eats healthy     Back  "Care No     Exercise Yes     Comment: walking daily, water aerobics 3 x weekly      Bike Helmet Not Asked     Seat Belt Not Asked     Self-Exams Not Asked   Social History Narrative     None       Review of Systems:  Skin:  Negative     Eyes:  Positive for tearing  ENT:  Negative    Respiratory:  Negative dyspnea on exertion  Cardiovascular:  Negative Positive for;dizziness  Gastroenterology: Negative nausea  Genitourinary:  Negative nocturia  Musculoskeletal:  Negative arthritis(right hip)  Neurologic:  Positive for headaches  Psychiatric:  Positive for sleep disturbances  Heme/Lymph/Imm:  Negative    Endocrine:  Negative      Physical Exam:  Vitals: /78   Pulse 65   Ht 1.6 m (5' 3\")   Wt 55.3 kg (122 lb)   BMI 21.61 kg/m      Constitutional:  cooperative, alert and oriented, well developed, well nourished, in no acute distress        Skin:  warm and dry to the touch, no apparent skin lesions or masses noted          Head:  normocephalic, no masses or lesions        Eyes:  pupils equal and round, conjunctivae and lids unremarkable, sclera white, no xanthalasma, EOMS intact, no nystagmus        Lymph:No Cervical lymphadenopathy present;No thyromegaly     ENT:  no pallor or cyanosis, dentition good        Neck:  carotid pulses are full and equal bilaterally, JVP normal, no carotid bruit        Respiratory:  normal breath sounds, clear to auscultation, normal A-P diameter, normal symmetry, normal respiratory excursion, no use of accessory muscles         Cardiac: regular rhythm     crisp prosthetic valve sounds systolic ejection murmur;grade 2;radiation to the carotid        pulses full and equal, no bruits auscultated                                        GI:  abdomen soft, non-tender, BS normoactive, no mass, no HSM, no bruits        Extremities and Muscular Skeletal:  no deformities, clubbing, cyanosis, erythema observed;no edema              Neurological:  no gross motor deficits        Psych:  Alert " and Oriented x 3      Recent Lab Results:  LIPID RESULTS:  Lab Results   Component Value Date    CHOL 184 07/26/2017    HDL 78 07/26/2017    LDL 90 07/26/2017    TRIG 78 07/26/2017       LIVER ENZYME RESULTS:  Lab Results   Component Value Date    AST 26.0 05/19/2016    ALT <5 (L) 07/26/2017       CBC RESULTS:  Lab Results   Component Value Date    WBC 10.1 07/23/2010    RBC 4.04 07/23/2010    HGB 12.2 07/23/2010    HCT 37.9 07/23/2010    MCV 94 07/23/2010    MCH 30.2 07/23/2010    MCHC 32.2 07/23/2010    RDW 14.0 07/23/2010     08/02/2010       BMP RESULTS:  Lab Results   Component Value Date    .0 05/19/2016    POTASSIUM 4.6 05/19/2016    CHLORIDE 104.0 05/19/2016    CO2 26 07/23/2010    ANIONGAP 5 (L) 07/23/2010    GLC 86.0 05/19/2016    BUN 18.0 05/19/2016    CR 0.9 05/19/2016    GFRESTIMATED 82 07/23/2010    GFRESTBLACK >90 07/23/2010    HOLLIE 9.1 05/19/2016        A1C RESULTS:  Lab Results   Component Value Date    A1C 5.9 06/25/2010       INR RESULTS:  Lab Results   Component Value Date    INR 0.97 05/06/2014    INR 1.03 08/02/2010           CC  Sally Singh MD  [facility]  [address] [place] [address]

## 2019-09-18 NOTE — PROGRESS NOTES
Service Date: 2019      HISTORY OF PRESENT ILLNESS:  I had the pleasure of following up on our mutual patient, Venecia Atkinson.  I last saw her 2 years ago.  She is an incredibly pleasant and fit 64-year-old woman.  You may remember that she had a bicuspid aortic valve and underwent an ATS mechanical 20 mm aortic valve replacement back in .  Her other big issue was that she had breast cancer in  and a lymph node recurrence a couple of years ago.  She is currently only on Coumadin for her valve and tamoxifen for long-term protection.  I do not have access to blood work labs, but she told me you have been checking regularly and they are all good.  She reports really no cardiovascular complaints whatsoever.  She does describe unusual imbalance which she notes in the morning.  It has been present for many years.  It is not any worse and it goes away on its own.  She has never had syncopal events, heart failure symptoms, angina.  I do not know exactly what chemotherapy agent she had in the past.  I know one of them was Adriamycin.  I do not know if any of them are possibly neurotoxic to contribute to this nonspecific imbalanced feeling.  We reviewed her echocardiogram today.  Her left ventricle and right ventricle function are normal.  The mechanical aortic valve is working perfectly normal.  The other valves are normal.        ASSESSMENT AND PLAN:  At this junction, I have made no changes.  I would like to see her back in 3 years.  We will repeat the echocardiogram at that time as routine followup.  I commended her on how well she is doing.      Today's visit was 20 minutes, greater than 50% counseling.      cc:   [place] [address]         BRIANNA MENDEZ MD             D: 2019   T: 2019   MT: FLORA      Name:     VENECIA ATKINSON   MRN:      [id number]        Account:      [id number]   :      1955            Service Date: 09/18/2019      Document: [id number]

## 2019-09-18 NOTE — LETTER
9/18/2019      Sally Singh MD  [facility] [address]  [facility] [address]      RE: Venecia Atkinson       Dear Colleague,    I had the pleasure of seeing Venecia Atkinson in the [facility].    Service Date: 09/18/2019      HISTORY OF PRESENT ILLNESS:  I had the pleasure of following up on our mutual patient, Venecia Atkinson.  I last saw her 2 years ago.  She is an incredibly pleasant and fit 64-year-old woman.  You may remember that she had a bicuspid aortic valve and underwent an ATS mechanical 20 mm aortic valve replacement back in 2010.  Her other big issue was that she had breast cancer in 2009 and a lymph node recurrence a couple of years ago.  She is currently only on Coumadin for her valve and tamoxifen for long-term protection.  I do not have access to blood work labs, but she told me you have been checking regularly and they are all good.  She reports really no cardiovascular complaints whatsoever.  She does describe unusual imbalance which she notes in the morning.  It has been present for many years.  It is not any worse and it goes away on its own.  She has never had syncopal events, heart failure symptoms, angina.  I do not know exactly what chemotherapy agent she had in the past.  I know one of them was Adriamycin.  I do not know if any of them are possibly neurotoxic to contribute to this nonspecific imbalanced feeling.  We reviewed her echocardiogram today.  Her left ventricle and right ventricle function are normal.  The mechanical aortic valve is working perfectly normal.  The other valves are normal.        ASSESSMENT AND PLAN:  At this junction, I have made no changes.  I would like to see her back in 3 years.  We will repeat the echocardiogram at that time as routine followup.  I commended her on how well she is doing.      Today's visit was 20 minutes, greater than 50% counseling.      cc:   Sally Singh MD   [facility] [place] [address]         BRIANNA SNIGH MD             D: 2019   T: 2019   MT: FLORA      Name:     CAROLEE SMITH   MRN:      [id number]        Account:      [id number]   :      1955           Service Date: 2019      Document: [id number]         Outpatient Encounter Medications as of 2019   Medication Sig Dispense Refill     acetaminophen (TYLENOL) 325 MG tablet Take 325-650 mg by mouth every 6 hours as needed for mild pain       amoxicillin (AMOXIL) 500 MG capsule Take 4 capsules by mouth 1 hour before procedure 4 capsule 4     Lactobacillus Acid-Pectin (LACTOBACILLUS ACIDOPHILUS) TABS Take 1 tablet by mouth daily        tamoxifen (NOLVADEX) 20 MG tablet Take by mouth daily       Warfarin Sodium (COUMADIN PO) Take 5 mg by mouth daily        No facility-administered encounter medications on file as of 2019.        Again, thank you for allowing me to participate in the care of your patient.      Sincerely,    Brianna Singh MD     [facility]

## 2019-09-18 NOTE — LETTER
9/18/2019    Sally Singh MD  [facility] [address]  [facility] [address]    RE: Venecia Atkinson       Dear Colleague,    I had the pleasure of seeing Venecia Atkinson in the [facility].    HPI and Plan:   See dictation    Orders Placed This Encounter   Procedures     Follow-Up with Cardiologist     No orders of the defined types were placed in this encounter.    There are no discontinued medications.      Encounter Diagnosis   Name Primary?     Aortic valve disorder Yes       CURRENT MEDICATIONS:  Current Outpatient Medications   Medication Sig Dispense Refill     acetaminophen (TYLENOL) 325 MG tablet Take 325-650 mg by mouth every 6 hours as needed for mild pain       amoxicillin (AMOXIL) 500 MG capsule Take 4 capsules by mouth 1 hour before procedure 4 capsule 4     Lactobacillus Acid-Pectin (LACTOBACILLUS ACIDOPHILUS) TABS Take 1 tablet by mouth daily        tamoxifen (NOLVADEX) 20 MG tablet Take by mouth daily       Warfarin Sodium (COUMADIN PO) Take 5 mg by mouth daily          ALLERGIES   No Known Allergies    PAST MEDICAL HISTORY:  Past Medical History:   Diagnosis Date     Antiplatelet or antithrombotic long-term use     coumadin for valve replacement     Aortic valve disorders 2010    AVR     Atrial flutter (H)      Breast cancer (H) 2009, 2014(recur)    ER+ IN+ HER-, Tx with surgery, adriamycin/cyclophosphamide x ?4 or 6 cycle,  no XRT     Numbness and tingling     side affect from chemo       PAST SURGICAL HISTORY:  Past Surgical History:   Procedure Laterality Date     DISSECT LYMPH NODE AXILLA Left 10/24/2014    Procedure: DISSECT LYMPH NODE AXILLA;  Surgeon: Antwan Kraus MD;  Location: [facility] OR     HYSTERECTOMY TOTAL ABDOMINAL       INSERT PORT VASCULAR ACCESS  5/6/2014    Procedure: INSERT PORT VASCULAR ACCESS;  Surgeon: Antwan Kraus MD;  Location: [facility] OR     MASTECTOMY, SIMPLE RT/LT/ARIAN  2009    left      ORTHOPEDIC SURGERY Left  2016    ankle foot, hardware     REMOVE PORT VASCULAR ACCESS N/A 10/24/2014    Procedure: REMOVE PORT VASCULAR ACCESS;  Surgeon: Antwan Kraus MD;  Location: [facility] OR     REPLACE VALVE AORTIC  2010     20 mm ATS mechanical valve     THORACIC SURGERY      2005 lung surg after pneumonia     TUBAL LIGATION         FAMILY HISTORY:  Family History   Problem Relation Age of Onset     Cancer Mother         lung     Cancer Sister         ovarian      Chronic Obstructive Pulmonary Disease Father      Heart Disease Sister      Other - See Comments Brother         suicide       SOCIAL HISTORY:  Social History     Socioeconomic History     Marital status:      Spouse name: None     Number of children: None     Years of education: None     Highest education level: None   Occupational History     None   Social Needs     Financial resource strain: None     Food insecurity:     Worry: None     Inability: None     Transportation needs:     Medical: None     Non-medical: None   Tobacco Use     Smoking status: Former Smoker     Packs/day: 1.00     Years: 32.00     Pack years: 32.00     Start date: 4/30/2007     Smokeless tobacco: Never Used   Substance and Sexual Activity     Alcohol use: Yes     Comment: 1 per day     Drug use: No     Sexual activity: None   Lifestyle     Physical activity:     Days per week: None     Minutes per session: None     Stress: None   Relationships     Social connections:     Talks on phone: None     Gets together: None     Attends Baptism service: None     Active member of club or organization: None     Attends meetings of clubs or organizations: None     Relationship status: None     Intimate partner violence:     Fear of current or ex partner: None     Emotionally abused: None     Physically abused: None     Forced sexual activity: None   Other Topics Concern     Parent/sibling w/ CABG, MI or angioplasty before 65F 55M? Not Asked      Service Not Asked     Blood Transfusions Not Asked  "    Caffeine Concern Yes     Comment: 2 cups caffeine per day     Occupational Exposure Not Asked     Hobby Hazards Not Asked     Sleep Concern Yes     Stress Concern Not Asked     Weight Concern No     Special Diet No     Comment: eats healthy     Back Care No     Exercise Yes     Comment: walking daily, water aerobics 3 x weekly      Bike Helmet Not Asked     Seat Belt Not Asked     Self-Exams Not Asked   Social History Narrative     None       Review of Systems:  Skin:  Negative     Eyes:  Positive for tearing  ENT:  Negative    Respiratory:  Negative dyspnea on exertion  Cardiovascular:  Negative Positive for;dizziness  Gastroenterology: Negative nausea  Genitourinary:  Negative nocturia  Musculoskeletal:  Negative arthritis(right hip)  Neurologic:  Positive for headaches  Psychiatric:  Positive for sleep disturbances  Heme/Lymph/Imm:  Negative    Endocrine:  Negative      Physical Exam:  Vitals: /78   Pulse 65   Ht 1.6 m (5' 3\")   Wt 55.3 kg (122 lb)   BMI 21.61 kg/m       Constitutional:  cooperative, alert and oriented, well developed, well nourished, in no acute distress        Skin:  warm and dry to the touch, no apparent skin lesions or masses noted          Head:  normocephalic, no masses or lesions        Eyes:  pupils equal and round, conjunctivae and lids unremarkable, sclera white, no xanthalasma, EOMS intact, no nystagmus        Lymph:No Cervical lymphadenopathy present;No thyromegaly     ENT:  no pallor or cyanosis, dentition good        Neck:  carotid pulses are full and equal bilaterally, JVP normal, no carotid bruit        Respiratory:  normal breath sounds, clear to auscultation, normal A-P diameter, normal symmetry, normal respiratory excursion, no use of accessory muscles         Cardiac: regular rhythm     crisp prosthetic valve sounds systolic ejection murmur;grade 2;radiation to the carotid        pulses full and equal, no bruits auscultated                                    "     GI:  abdomen soft, non-tender, BS normoactive, no mass, no HSM, no bruits        Extremities and Muscular Skeletal:  no deformities, clubbing, cyanosis, erythema observed;no edema              Neurological:  no gross motor deficits        Psych:  Alert and Oriented x 3      Recent Lab Results:  LIPID RESULTS:  Lab Results   Component Value Date    CHOL 184 07/26/2017    HDL 78 07/26/2017    LDL 90 07/26/2017    TRIG 78 07/26/2017       LIVER ENZYME RESULTS:  Lab Results   Component Value Date    AST 26.0 05/19/2016    ALT <5 (L) 07/26/2017       CBC RESULTS:  Lab Results   Component Value Date    WBC 10.1 07/23/2010    RBC 4.04 07/23/2010    HGB 12.2 07/23/2010    HCT 37.9 07/23/2010    MCV 94 07/23/2010    MCH 30.2 07/23/2010    MCHC 32.2 07/23/2010    RDW 14.0 07/23/2010     08/02/2010       BMP RESULTS:  Lab Results   Component Value Date    .0 05/19/2016    POTASSIUM 4.6 05/19/2016    CHLORIDE 104.0 05/19/2016    CO2 26 07/23/2010    ANIONGAP 5 (L) 07/23/2010    GLC 86.0 05/19/2016    BUN 18.0 05/19/2016    CR 0.9 05/19/2016    GFRESTIMATED 82 07/23/2010    GFRESTBLACK >90 07/23/2010    HOLLIE 9.1 05/19/2016        A1C RESULTS:  Lab Results   Component Value Date    A1C 5.9 06/25/2010       INR RESULTS:  Lab Results   Component Value Date    INR 0.97 05/06/2014    INR 1.03 08/02/2010           CC  Sally Singh MD  [place] [phone]    Thank you for allowing me to participate in the care of your patient.      Sincerely,     Micha Singh MD     [facility]    cc:   Sally Singh MD  [place] [phone]

## 2020-02-20 ENCOUNTER — TELEPHONE (OUTPATIENT)
Dept: CARDIOLOGY | Facility: CLINIC | Age: 65
End: 2020-02-20

## 2020-02-20 NOTE — TELEPHONE ENCOUNTER
Informed Pt did speak with Another cardiologist and they would prefer that DR Singh review and make medication decisions.  OMAR Jones RN

## 2020-02-20 NOTE — TELEPHONE ENCOUNTER
Pt called in she is to have dental implants a total of 4 done for permanent dentures. Pt is on warfarin for aortic valve , and briefly had Afib post valve surgery. Pt has never had a stroke, was a oncology Pt previously also seen by DR Little. Pt was to start holding tomorrow for implants next week, and dental implant provider wants Pt at 1.0. Informed Pt DR Singh out of office, but will message DR Little if he is willing to give instruction. OMAR Jones RN

## 2020-02-24 NOTE — TELEPHONE ENCOUNTER
"I would recommend dental implant when INR <1.5  Usually this means a hold of 3-5 days max before procedure-so I think they are holding too soon--maybe you can call D[facility] and discuss this with them (ie timing of hold start compared to date of procedure)  If you think she will below this INR 1,5 well before procedure then I would bridge with lovenox once she is below about 1.8 until the night before the procedure  (minimum 12 hours off lovenox before procedure) and restart coumadin the night of the procedure (wouldn't necessarily bridge afterwards, but only before and only if they really want to wait this many days from when INR <1.5 until they will do procedure)--ATS valve AVR is more 'forgiving\" when INR is lower  "

## 2020-02-25 NOTE — TELEPHONE ENCOUNTER
Did forward DR Singh's note to PMD DR Singh, and contacted INR clinic at [facility] and asked them to get note from DR Singh. OMAR Jones RN

## 2020-04-30 DIAGNOSIS — Z95.2 S/P AVR: ICD-10-CM

## 2020-04-30 RX ORDER — AMOXICILLIN 500 MG/1
CAPSULE ORAL
Qty: 4 CAPSULE | Refills: 4 | Status: SHIPPED | OUTPATIENT
Start: 2020-04-30 | End: 2021-06-14

## 2020-05-12 ENCOUNTER — HOSPITAL ENCOUNTER (OUTPATIENT)
Dept: MAMMOGRAPHY | Facility: CLINIC | Age: 65
Discharge: HOME | End: 2020-05-12
Attending: INTERNAL MEDICINE | Admitting: INTERNAL MEDICINE
Payer: COMMERCIAL

## 2020-05-12 DIAGNOSIS — Z12.31 VISIT FOR SCREENING MAMMOGRAM: ICD-10-CM

## 2020-05-12 PROCEDURE — 77063 BREAST TOMOSYNTHESIS BI: CPT | Mod: 52

## 2021-05-10 DIAGNOSIS — Q23.1 CONGENITAL INSUFFICIENCY OF AORTIC VALVE: Primary | ICD-10-CM

## 2021-05-11 ENCOUNTER — HOSPITAL ENCOUNTER (OUTPATIENT)
Dept: MAMMOGRAPHY | Facility: CLINIC | Age: 66
Discharge: HOME | End: 2021-05-11
Attending: INTERNAL MEDICINE | Admitting: INTERNAL MEDICINE
Payer: COMMERCIAL

## 2021-05-11 DIAGNOSIS — Z12.31 VISIT FOR SCREENING MAMMOGRAM: ICD-10-CM

## 2021-05-11 PROCEDURE — 77067 SCR MAMMO BI INCL CAD: CPT | Mod: 52

## 2021-05-17 ENCOUNTER — HOSPITAL ENCOUNTER (OUTPATIENT)
Dept: CARDIOLOGY | Facility: CLINIC | Age: 66
Discharge: HOME | End: 2021-05-17
Attending: INTERNAL MEDICINE | Admitting: INTERNAL MEDICINE
Payer: COMMERCIAL

## 2021-05-17 DIAGNOSIS — Q23.1 CONGENITAL INSUFFICIENCY OF AORTIC VALVE: ICD-10-CM

## 2021-05-17 PROCEDURE — 93306 TTE W/DOPPLER COMPLETE: CPT

## 2021-05-17 PROCEDURE — 93306 TTE W/DOPPLER COMPLETE: CPT | Mod: 26 | Performed by: INTERNAL MEDICINE

## 2021-05-18 ENCOUNTER — VIRTUAL VISIT (OUTPATIENT)
Dept: CARDIOLOGY | Facility: CLINIC | Age: 66
End: 2021-05-18
Payer: COMMERCIAL

## 2021-05-18 DIAGNOSIS — R42 DIZZINESS: Primary | ICD-10-CM

## 2021-05-18 DIAGNOSIS — I35.9 AORTIC VALVE DISORDER: ICD-10-CM

## 2021-05-18 DIAGNOSIS — I48.92 ATRIAL FLUTTER, UNSPECIFIED TYPE (H): ICD-10-CM

## 2021-05-18 PROCEDURE — 99213 OFFICE O/P EST LOW 20 MIN: CPT | Mod: GT | Performed by: INTERNAL MEDICINE

## 2021-05-18 NOTE — LETTER
"5/18/2021    Sally Singh MD  [facility] [address]  [facility] [address]    RE: Venecia Thomas Demetrio       Dear Colleague,    I had the pleasure of seeing Venecia Martha Atkinson in the [facility].    Vitals - Patient Reported  Systolic (Patient Reported): (pt does not do)  Diastolic (Patient Reported): (pt does not do)  Weight (Patient Reported): 54.4 kg (120 lb)  Height (Patient Reported): 157.5 cm (5' 2\")  BMI (Based on Pt Reported Ht/Wt): 21.95  Pulse (Patient Reported): (pt does not do)       Review Of Systems  Skin: Nail beds get blue, fingers turn white  Eyes:Ears/Nose/Throat: TMJ    Respiratory: NEGATIVE  Cardiovascular: Lightheadedness.  Rare occasionally sits safety on floor or hold counter to avoid syncope.  Gastrointestinal: NEGATIVE  Genitourinary:NEGATIVE   Musculoskeletal: NEGATIVE  Neurologic: Tingling in hands.  Psychiatric: Anxiety.  Hematologic/Lymphatic/Immunologic: NEGATIVE  Endocrine:  NEGATIVE      Kin Omledo NREMT      Telephone number of patient: [phone]       What phone number would you like to be contacted at? [phone]  How would you like to obtain your AVS? Mail a copy      Venecia is a 66 year old who is being evaluated via a billable video visit.      Video Start Time:  419pm  Video-Visit Details    Type of service:  Video Visit    Video End [id number]  Originating Location (pt. Location): Home    Distant Location (provider location):  [facility] [place]     Platform used for Video Visit: Doximity    FAILED DOXI VIDEO SEE DICTATION STARTED aortic stenosis DOX VIDEO AND COMPLETED ON PHONE    Thank you for allowing me to participate in the care of your patient.      Sincerely,     Micha Singh MD     [facility]    cc:   No referring provider defined for this encounter.        "

## 2021-05-18 NOTE — PROGRESS NOTES
"Vitals - Patient Reported  Systolic (Patient Reported): (pt does not do)  Diastolic (Patient Reported): (pt does not do)  Weight (Patient Reported): 54.4 kg (120 lb)  Height (Patient Reported): 157.5 cm (5' 2\")  BMI (Based on Pt Reported Ht/Wt): 21.95  Pulse (Patient Reported): (pt does not do)       Review Of Systems  Skin: Nail beds get blue, fingers turn white  Eyes:Ears/Nose/Throat: TMJ    Respiratory: NEGATIVE  Cardiovascular: Lightheadedness.  Rare occasionally sits safety on floor or hold counter to avoid syncope.  Gastrointestinal: NEGATIVE  Genitourinary:NEGATIVE   Musculoskeletal: NEGATIVE  Neurologic: Tingling in hands.  Psychiatric: Anxiety.  Hematologic/Lymphatic/Immunologic: NEGATIVE  Endocrine:  NEGATIVE      Kin Olmedo NREMT      Telephone number of patient: [phone]       What phone number would you like to be contacted at? [phone]  How would you like to obtain your AVS? Mail a copy      Venecia is a 66 year old who is being evaluated via a billable video visit.      Video Start Time:  419pm  Video-Visit Details    Type of service:  Video Visit    Video End [id number]  Originating Location (pt. Location): Home    Distant Location (provider location):  [facility]     Platform used for Video Visit: Doximity    FAILED DOXI VIDEO SEE DICTATION STARTED aortic stenosis DOX VIDEO AND COMPLETED ON PHONE  "

## 2021-05-19 NOTE — PROGRESS NOTES
Service Date: 05/18/2021    COMBINED VIDEO AND TELEPHONE CONFERENCE:  This was an unscheduled video conference on Doximity.  I did see the patient, but on her cell phone the signal cut out and then we lost both audio and visual, so I switched it to telephone call.   I initiated the call at 4:19, for which it was on video.  We switched and then the rest was a telephone call until 4:40 p.m.      HISTORY OF PRESENT ILLNESS:  Venecia Atkinson is a very fit, 66-year-old woman.  She had aortic valve insufficiency in 2010.  Heart cath showed trivial to no coronary disease but aortic valve insufficiency.  She underwent an ATS mechanical valve in her aortic position.  The patient had breast cancer.  She is now 7 years free.  She has a history of atrial flutter in the past.  Her current medication list that we have is lactobacillus, Coumadin, amoxicillin p.r.n., and tamoxifen.  The patient had TMJ and she was seen at a doctor's office or ENT office for that.  Apparently, they noted when she stood up, she was very lightheaded and they sent her to an urgent care center.  I do not have the paperwork from the urgent care center, but the patient reports that sitting and supine her blood pressure was normal, but standing up she reports the blood pressure went up to 153/85.  I was perplexed because I expected her to tell me that her blood pressure dropped when she was standing.  Further to make it complex, she told me that they recommended support stockings.  To me that seems opposite, so I don't know if there was some error in the reported blood pressure numbers.  She was not aware of any palpitations during this and she states it happens essentially every day, perhaps a little bit more in the morning but could occur any time.  There is no chest pain problem.  An echocardiogram was performed and we just reviewed it, and the echo is completely normal.  Her aortic valve is working well, and according to the rhythm strip at the echo she  was in sinus rhythm throughout, so therefore, I cannot explain why she is having this recurrent dizziness, and I am perplexed why the blood pressure would go up with standing and even more perplexed why they would recommend support stockings, unless somehow we have the data backwards and her blood pressure really dropped.  I did explain this to her.  She does not have a home blood pressure machine, so we could not check it.  I am going to have her wear a 48-hour Holter monitor, just to make sure that there is not recurrence of her atrial flutter, and then she will come in for an actual office visit.  We will do a full exam including orthostatic blood pressure and pulse, carotid sinus massage, etc., and review the results of the Holter monitor.  She had blood work through [facility] in [place] in March, which I reviewed with her.  Her electrolyte panel was unremarkable.  Her CBC panel was unremarkable.  Her INR was slightly low earlier this month, and she is having that repeated.  Her cholesterol numbers do show an elevated LDL, but she had essentially 0 to trivial plaque, so I do not know that we have to be too aggressive treating the LDL, given her heart cath was so clear.  Granted, that was 10-11 years ago.  Her albumin and liver tests were normal.  Her TSH was normal.  This was all back in March.  Therefore, again, we will have her wear a 48-hour Holter and then come in afterwards for a physical exam.      This was a 4:19 to 4:40 p.m. visit, combined Doximity video and phone.    Kin Singh MD    cc:  [place] [address]     Micha Singh MD        D: 2021   T: 2021   MT: dania    Name:     CAROLEE SMITH  MRN:      [id number]        Account:      [id number]   :      1955           Service Date: 2021       Document: [id number]

## 2021-05-25 ENCOUNTER — HOSPITAL ENCOUNTER (OUTPATIENT)
Dept: CARDIOLOGY | Facility: CLINIC | Age: 66
Discharge: HOME | End: 2021-05-25
Attending: INTERNAL MEDICINE | Admitting: INTERNAL MEDICINE
Payer: COMMERCIAL

## 2021-05-25 DIAGNOSIS — I48.92 ATRIAL FLUTTER, UNSPECIFIED TYPE (H): ICD-10-CM

## 2021-05-25 DIAGNOSIS — I35.9 AORTIC VALVE DISORDER: ICD-10-CM

## 2021-05-25 DIAGNOSIS — R42 DIZZINESS: ICD-10-CM

## 2021-05-25 PROCEDURE — 93227 XTRNL ECG REC<48 HR R&I: CPT | Performed by: INTERNAL MEDICINE

## 2021-05-25 PROCEDURE — 93225 XTRNL ECG REC<48 HRS REC: CPT

## 2021-05-29 NOTE — PROGRESS NOTES
HISTORY OF PRESENT ILLNESS:    This is a 66 year old female who follows with Dr. Singh at [facility]  His past medical history includes:  bicuspid aortic valve s/p AVR, breast cancer, paroxysmal atrial flutter    Ms Atkinson was found to have significant aortic valve insufficiency in 2010 and underwent a bileaflet mechanical aortic valve replacement.  Coronary angiography showed only trivial coronary artery disease She did have some paroxysmal atrial flutter after surgery, but none in recent follow up.    She has a history of breast cancer and underwent a bilateral mastectomy in 2009.  She had recurrence in 2014 and did get chemotherapy (details unknown)    Over the years, she has not described any significant cardiovascular complaints or recurrence of her A-flutter.  Her most recent ECHO (5/17/21) showed LVEF 65-70%, normal RV function, normal AV gradients  ( mean 15-16 mmHg), RVSP 20 mmHg    She saw Dr. Singh a couple of weeks ago for her annual review.  She complained of some recent positional lightheadedness.  A  holter monitor was ordered and BP assessment.    Ms Atkinson comes in today with her family member who assists with her history and symptoms.  She states for some time she has had balance issues and intermittent lightheadedness.  The lightheadedness is sometimes with activity, some times is positional, and other times can occur while sitting.  She has not lost consciousness  She has noted that if she is walking and turns her head to the side, she has become lightheaded.  She denies any chest pain or significant shortness of breath.  He has not had any significant palpitations, orthopnea, or peripheral edema.  She states that when her orthostatic BP was checked last month at urgent care, it was not abnormal.  She drinks a good amount of water each day and eats a healthy diet.  She does suffer from TMJ.  Her ears were checked by ENT and was told that her hearing was not of concern and she  did not have an ear infection.         I reviewed her 48-hr Holter monitor (5/25/21)  This showed SR with intermittent first degree AV block.  Average HR 70 bpm.  There were no significant pauses and rare PACs/PVCs.  No A-fib/A-flutter.  Her dizziness symptoms did not correlate to any arrhythmia      VITAL SIGNS:  BP: 122/64  Pulse: 68  Weight:  119 lbs      IMPRESSION AND PLAN:    S/p Mechanical Aortic Valve Replacement  -ECHO (5/2021) showed normal valve gradients  LVEF 65%  -chronic warfarin  -SBE prophylaxis     Lightheadedness  -sometimes positional and other times can occur erratic  -will check carotid ultrasound and review results over the phone  -holter monitor (5/25/21) did not show any arrhythmia associated with her symptoms.  -etiology unclear    Paroxysmal Atrial Flutter  -noted early after valve surgery (2010)  -no recurrence    The total time for the visit today was 35 minutes which includes patient visit, reviewing of records, discussion, and placing of orders of the outpatient coordination of cardiovascular care as described.  The level of medical decision making during this visit was of moderate complexity.  Thank you for allowing me to participate in their care.    Orders Placed This Encounter   Procedures     US Carotid Bilateral       Orders Placed This Encounter   Medications     Polyethyl Glycol-Propyl Glycol (SYSTANE OP)     Sig: Apply to eye as needed       There are no discontinued medications.      Encounter Diagnoses   Name Primary?     Dizziness      Atrial flutter, unspecified type (H)      Aortic valve disorder        CURRENT MEDICATIONS:  Current Outpatient Medications   Medication Sig Dispense Refill     acetaminophen (TYLENOL) 325 MG tablet Take 325-650 mg by mouth every 6 hours as needed for mild pain       amoxicillin (AMOXIL) 500 MG capsule Take 4 capsules by mouth 1 hour before procedure (Patient taking differently: Take 4 capsules by mouth 1 hour before dental procedures) 4  capsule 4     Lactobacillus Acid-Pectin (LACTOBACILLUS ACIDOPHILUS) TABS Take 1 tablet by mouth daily        Polyethyl Glycol-Propyl Glycol (SYSTANE OP) Apply to eye as needed       Warfarin Sodium (COUMADIN PO) Take 5 mg by mouth daily        tamoxifen (NOLVADEX) 20 MG tablet Take by mouth daily         ALLERGIES   No Known Allergies    PAST MEDICAL HISTORY:  Past Medical History:   Diagnosis Date     Antiplatelet or antithrombotic long-term use     coumadin for valve replacement     Aortic valve disorders 2010    AVR     Atrial flutter (H)      Breast cancer (H) 2009, 2014(recur)    ER+ TX+ HER-, Tx with surgery, adriamycin/cyclophosphamide x ?4 or 6 cycle,  no XRT     Numbness and tingling     side affect from chemo       PAST SURGICAL HISTORY:  Past Surgical History:   Procedure Laterality Date     DISSECT LYMPH NODE AXILLA Left 10/24/2014    Procedure: DISSECT LYMPH NODE AXILLA;  Surgeon: Antwan Kraus MD;  Location: [facility] OR     HYSTERECTOMY TOTAL ABDOMINAL       INSERT PORT VASCULAR ACCESS  5/6/2014    Procedure: INSERT PORT VASCULAR ACCESS;  Surgeon: Antwan Kraus MD;  Location: [facility] OR     MASTECTOMY, SIMPLE RT/LT/ARIAN  2009    left      ORTHOPEDIC SURGERY Left 2016    ankle foot, hardware     REMOVE PORT VASCULAR ACCESS N/A 10/24/2014    Procedure: REMOVE PORT VASCULAR ACCESS;  Surgeon: Antwan Kraus MD;  Location: [facility] OR     REPLACE VALVE AORTIC  2010     20 mm ATS mechanical valve     THORACIC SURGERY      2005 lung surg after pneumonia     TUBAL LIGATION         FAMILY HISTORY:  Family History   Problem Relation Age of Onset     Cancer Mother         lung     Cancer Sister         ovarian      Chronic Obstructive Pulmonary Disease Father      Heart Disease Sister      Other - See Comments Brother         suicide       SOCIAL HISTORY:  Social History     Socioeconomic History     Marital status:      Spouse name: None     Number of children: None     Years of education: None     Highest  education level: None   Occupational History     None   Social Needs     Financial resource strain: None     Food insecurity     Worry: None     Inability: None     Transportation needs     Medical: None     Non-medical: None   Tobacco Use     Smoking status: Former Smoker     Packs/day: 1.00     Years: 32.00     Pack years: 32.00     Start date: 4/30/2007     Smokeless tobacco: Never Used   Substance and Sexual Activity     Alcohol use: Yes     Comment: 1 per day     Drug use: No     Sexual activity: None   Lifestyle     Physical activity     Days per week: None     Minutes per session: None     Stress: None   Relationships     Social connections     Talks on phone: None     Gets together: None     Attends Caodaism service: None     Active member of club or organization: None     Attends meetings of clubs or organizations: None     Relationship status: None     Intimate partner violence     Fear of current or ex partner: None     Emotionally abused: None     Physically abused: None     Forced sexual activity: None   Other Topics Concern     Parent/sibling w/ CABG, MI or angioplasty before 65F 55M? Not Asked      Service Not Asked     Blood Transfusions Not Asked     Caffeine Concern Yes     Comment: 2 cups caffeine per day     Occupational Exposure Not Asked     Hobby Hazards Not Asked     Sleep Concern Yes     Stress Concern Not Asked     Weight Concern No     Special Diet No     Comment: eats healthy     Back Care No     Exercise Yes     Comment: walking daily, water aerobics 3 x weekly      Bike Helmet Not Asked     Seat Belt Not Asked     Self-Exams Not Asked   Social History Narrative     None       Review of Systems:  Skin:  Negative       Eyes:  Positive for   dry eye  ENT:  Negative      Respiratory:  Positive for   dyapnea with anxiety episodes   Cardiovascular:    Positive for;lightheadedness    Gastroenterology: Negative      Genitourinary:  Negative      Musculoskeletal:  Positive for    "cramping in feet  Neurologic:  Negative      Psychiatric:  Positive for sleep disturbances;excessive stress;anxiety;depression    Heme/Lymph/Imm:  Negative      Endocrine:  Positive for   cold hands and feet that turn white    Physical Exam:  Vitals: /64   Pulse 68   Ht 1.6 m (5' 3\")   Wt 54.4 kg (119 lb 14.4 oz)   BMI 21.24 kg/m      Constitutional:  cooperative thin      Skin:  warm and dry to the touch          Head:  normocephalic   alopecia    Eyes:  pupils equal and round        Lymph:      ENT:  no pallor or cyanosis        Neck:  JVP normal;no carotid bruit        Respiratory:  clear to auscultation;normal respiratory excursion         Cardiac: regular rhythm     crisp prosthetic valve sounds systolic ejection murmur;radiation to the carotid;grade 1        pulses full and equal                                        GI:  abdomen soft        Extremities and Muscular Skeletal:  no edema              Neurological:  no gross motor deficits        Psych:  Alert and Oriented x 3          CC  Micha Singh MD  [id number]  [place] [id number]                  "

## 2021-06-01 ENCOUNTER — OFFICE VISIT (OUTPATIENT)
Dept: CARDIOLOGY | Facility: CLINIC | Age: 66
End: 2021-06-01
Attending: INTERNAL MEDICINE
Payer: COMMERCIAL

## 2021-06-01 VITALS
HEART RATE: 68 BPM | SYSTOLIC BLOOD PRESSURE: 122 MMHG | DIASTOLIC BLOOD PRESSURE: 64 MMHG | HEIGHT: 63 IN | BODY MASS INDEX: 21.25 KG/M2 | WEIGHT: 119.9 LBS

## 2021-06-01 DIAGNOSIS — R42 DIZZINESS: ICD-10-CM

## 2021-06-01 DIAGNOSIS — I35.9 AORTIC VALVE DISORDER: ICD-10-CM

## 2021-06-01 DIAGNOSIS — I48.92 ATRIAL FLUTTER, UNSPECIFIED TYPE (H): ICD-10-CM

## 2021-06-01 PROCEDURE — 99214 OFFICE O/P EST MOD 30 MIN: CPT | Performed by: NURSE PRACTITIONER

## 2021-06-01 ASSESSMENT — MIFFLIN-ST. JEOR: SCORE: 1057.99

## 2021-06-01 NOTE — LETTER
6/1/2021    Sally Singh MD  [facility] [id number]  [facility] [id number]    RE: Venecia Thomas Atkinson       Dear Colleague,    I had the pleasure of seeing Venecia Atkinson in the [facility].    HISTORY OF PRESENT ILLNESS:    This is a 66 year old female who follows with Dr. Singh at [facility]  His past medical history includes:  bicuspid aortic valve s/p AVR, breast cancer, paroxysmal atrial flutter    Ms Atkinson was found to have significant aortic valve insufficiency in 2010 and underwent a bileaflet mechanical aortic valve replacement.  Coronary angiography showed only trivial coronary artery disease She did have some paroxysmal atrial flutter after surgery, but none in recent follow up.    She has a history of breast cancer and underwent a bilateral mastectomy in 2009.  She had recurrence in 2014 and did get chemotherapy (details unknown)    Over the years, she has not described any significant cardiovascular complaints or recurrence of her A-flutter.  Her most recent ECHO (5/17/21) showed LVEF 65-70%, normal RV function, normal AV gradients  ( mean 15-16 mmHg), RVSP 20 mmHg    She saw Dr. Singh a couple of weeks ago for her annual review.  She complained of some recent positional lightheadedness.  A  holter monitor was ordered and BP assessment.    Ms Atkinson comes in today with her family member who assists with her history and symptoms.  She states for some time she has had balance issues and intermittent lightheadedness.  The lightheadedness is sometimes with activity, some times is positional, and other times can occur while sitting.  She has not lost consciousness  She has noted that if she is walking and turns her head to the side, she has become lightheaded.  She denies any chest pain or significant shortness of breath.  He has not had any significant palpitations, orthopnea, or peripheral edema.   She states that when her orthostatic BP was checked last month at urgent care, it was not abnormal.  She drinks a good amount of water each day and eats a healthy diet.  She does suffer from TMJ.  Her ears were checked by ENT and was told that her hearing was not of concern and she did not have an ear infection.         I reviewed her 48-hr Holter monitor (5/25/21)  This showed SR with intermittent first degree AV block.  Average HR 70 bpm.  There were no significant pauses and rare PACs/PVCs.  No A-fib/A-flutter.  Her dizziness symptoms did not correlate to any arrhythmia      VITAL SIGNS:  BP: 122/64  Pulse: 68  Weight:  119 lbs      IMPRESSION AND PLAN:    S/p Mechanical Aortic Valve Replacement  -ECHO (5/2021) showed normal valve gradients  LVEF 65%  -chronic warfarin  -SBE prophylaxis     Lightheadedness  -sometimes positional and other times can occur erratic  -will check carotid ultrasound and review results over the phone  -holter monitor (5/25/21) did not show any arrhythmia associated with her symptoms.  -etiology unclear    Paroxysmal Atrial Flutter  -noted early after valve surgery (2010)  -no recurrence    The total time for the visit today was 35 minutes which includes patient visit, reviewing of records, discussion, and placing of orders of the outpatient coordination of cardiovascular care as described.  The level of medical decision making during this visit was of moderate complexity.  Thank you for allowing me to participate in their care.    Orders Placed This Encounter   Procedures     US Carotid Bilateral       Orders Placed This Encounter   Medications     Polyethyl Glycol-Propyl Glycol (SYSTANE OP)     Sig: Apply to eye as needed       There are no discontinued medications.      Encounter Diagnoses   Name Primary?     Dizziness      Atrial flutter, unspecified type (H)      Aortic valve disorder        CURRENT MEDICATIONS:  Current Outpatient Medications   Medication Sig Dispense Refill      acetaminophen (TYLENOL) 325 MG tablet Take 325-650 mg by mouth every 6 hours as needed for mild pain       amoxicillin (AMOXIL) 500 MG capsule Take 4 capsules by mouth 1 hour before procedure (Patient taking differently: Take 4 capsules by mouth 1 hour before dental procedures) 4 capsule 4     Lactobacillus Acid-Pectin (LACTOBACILLUS ACIDOPHILUS) TABS Take 1 tablet by mouth daily        Polyethyl Glycol-Propyl Glycol (SYSTANE OP) Apply to eye as needed       Warfarin Sodium (COUMADIN PO) Take 5 mg by mouth daily        tamoxifen (NOLVADEX) 20 MG tablet Take by mouth daily         ALLERGIES   No Known Allergies    PAST MEDICAL HISTORY:  Past Medical History:   Diagnosis Date     Antiplatelet or antithrombotic long-term use     coumadin for valve replacement     Aortic valve disorders 2010    AVR     Atrial flutter (H)      Breast cancer (H) 2009, 2014(recur)    ER+ NE+ HER-, Tx with surgery, adriamycin/cyclophosphamide x ?4 or 6 cycle,  no XRT     Numbness and tingling     side affect from chemo       PAST SURGICAL HISTORY:  Past Surgical History:   Procedure Laterality Date     DISSECT LYMPH NODE AXILLA Left 10/24/2014    Procedure: DISSECT LYMPH NODE AXILLA;  Surgeon: Antwan Kraus MD;  Location: [facility] OR     HYSTERECTOMY TOTAL ABDOMINAL       INSERT PORT VASCULAR ACCESS  5/6/2014    Procedure: INSERT PORT VASCULAR ACCESS;  Surgeon: Antwan Kraus MD;  Location: [facility] OR     MASTECTOMY, SIMPLE RT/LT/ARIAN  2009    left      ORTHOPEDIC SURGERY Left 2016    ankle foot, hardware     REMOVE PORT VASCULAR ACCESS N/A 10/24/2014    Procedure: REMOVE PORT VASCULAR ACCESS;  Surgeon: Antwan Kraus MD;  Location: [facility] OR     REPLACE VALVE AORTIC  2010     20 mm ATS mechanical valve     THORACIC SURGERY      2005 lung surg after pneumonia     TUBAL LIGATION         FAMILY HISTORY:  Family History   Problem Relation Age of Onset     Cancer Mother         lung     Cancer Sister         ovarian      Chronic Obstructive Pulmonary  Disease Father      Heart Disease Sister      Other - See Comments Brother         suicide       SOCIAL HISTORY:  Social History     Socioeconomic History     Marital status:      Spouse name: None     Number of children: None     Years of education: None     Highest education level: None   Occupational History     None   Social Needs     Financial resource strain: None     Food insecurity     Worry: None     Inability: None     Transportation needs     Medical: None     Non-medical: None   Tobacco Use     Smoking status: Former Smoker     Packs/day: 1.00     Years: 32.00     Pack years: 32.00     Start date: 4/30/2007     Smokeless tobacco: Never Used   Substance and Sexual Activity     Alcohol use: Yes     Comment: 1 per day     Drug use: No     Sexual activity: None   Lifestyle     Physical activity     Days per week: None     Minutes per session: None     Stress: None   Relationships     Social connections     Talks on phone: None     Gets together: None     Attends Congregation service: None     Active member of club or organization: None     Attends meetings of clubs or organizations: None     Relationship status: None     Intimate partner violence     Fear of current or ex partner: None     Emotionally abused: None     Physically abused: None     Forced sexual activity: None   Other Topics Concern     Parent/sibling w/ CABG, MI or angioplasty before 65F 55M? Not Asked      Service Not Asked     Blood Transfusions Not Asked     Caffeine Concern Yes     Comment: 2 cups caffeine per day     Occupational Exposure Not Asked     Hobby Hazards Not Asked     Sleep Concern Yes     Stress Concern Not Asked     Weight Concern No     Special Diet No     Comment: eats healthy     Back Care No     Exercise Yes     Comment: walking daily, water aerobics 3 x weekly      Bike Helmet Not Asked     Seat Belt Not Asked     Self-Exams Not Asked   Social History Narrative     None       Review of Systems:  Skin:   "Negative       Eyes:  Positive for   dry eye  ENT:  Negative      Respiratory:  Positive for   dyapnea with anxiety episodes   Cardiovascular:    Positive for;lightheadedness    Gastroenterology: Negative      Genitourinary:  Negative      Musculoskeletal:  Positive for   cramping in feet  Neurologic:  Negative      Psychiatric:  Positive for sleep disturbances;excessive stress;anxiety;depression    Heme/Lymph/Imm:  Negative      Endocrine:  Positive for   cold hands and feet that turn white    Physical Exam:  Vitals: /64   Pulse 68   Ht 1.6 m (5' 3\")   Wt 54.4 kg (119 lb 14.4 oz)   BMI 21.24 kg/m      Constitutional:  cooperative thin      Skin:  warm and dry to the touch          Head:  normocephalic   alopecia    Eyes:  pupils equal and round        Lymph:      ENT:  no pallor or cyanosis        Neck:  JVP normal;no carotid bruit        Respiratory:  clear to auscultation;normal respiratory excursion         Cardiac: regular rhythm     crisp prosthetic valve sounds systolic ejection murmur;radiation to the carotid;grade 1        pulses full and equal                                        GI:  abdomen soft        Extremities and Muscular Skeletal:  no edema              Neurological:  no gross motor deficits        Psych:  Alert and Oriented x 3      Thank you for allowing me to participate in the care of your patient.      Sincerely,     LATASHA Donahue [facility]    cc:   Micha Singh MD  [address] [facility]  [place] [id number]        "

## 2021-06-14 DIAGNOSIS — Z95.2 S/P AVR: ICD-10-CM

## 2021-06-14 RX ORDER — AMOXICILLIN 500 MG/1
CAPSULE ORAL
Qty: 4 CAPSULE | Refills: 4 | Status: SHIPPED | OUTPATIENT
Start: 2021-06-14

## 2021-06-18 ENCOUNTER — HOSPITAL ENCOUNTER (OUTPATIENT)
Dept: CARDIOLOGY | Facility: CLINIC | Age: 66
Discharge: HOME | End: 2021-06-18
Attending: NURSE PRACTITIONER | Admitting: NURSE PRACTITIONER
Payer: COMMERCIAL

## 2021-06-18 DIAGNOSIS — R42 DIZZINESS: ICD-10-CM

## 2021-06-18 PROCEDURE — 93880 EXTRACRANIAL BILAT STUDY: CPT

## 2021-06-18 PROCEDURE — 93880 EXTRACRANIAL BILAT STUDY: CPT | Mod: 26 | Performed by: INTERNAL MEDICINE

## 2022-07-05 ENCOUNTER — HOSPITAL ENCOUNTER (OUTPATIENT)
Dept: MAMMOGRAPHY | Facility: CLINIC | Age: 67
Discharge: HOME | End: 2022-07-05
Attending: INTERNAL MEDICINE | Admitting: INTERNAL MEDICINE
Payer: COMMERCIAL

## 2022-07-05 DIAGNOSIS — Z12.31 VISIT FOR SCREENING MAMMOGRAM: ICD-10-CM

## 2022-07-05 PROCEDURE — 77067 SCR MAMMO BI INCL CAD: CPT | Mod: 52

## 2023-07-06 ENCOUNTER — HOSPITAL ENCOUNTER (OUTPATIENT)
Dept: MAMMOGRAPHY | Facility: CLINIC | Age: 68
Discharge: HOME | End: 2023-07-06
Attending: FAMILY MEDICINE | Admitting: FAMILY MEDICINE
Payer: COMMERCIAL

## 2023-07-06 DIAGNOSIS — Z12.31 VISIT FOR SCREENING MAMMOGRAM: ICD-10-CM

## 2023-07-06 PROCEDURE — 77067 SCR MAMMO BI INCL CAD: CPT | Mod: 52

## 2024-07-11 ENCOUNTER — HOSPITAL ENCOUNTER (OUTPATIENT)
Dept: MAMMOGRAPHY | Facility: CLINIC | Age: 69
Discharge: HOME | End: 2024-07-11
Attending: FAMILY MEDICINE | Admitting: FAMILY MEDICINE
Payer: COMMERCIAL

## 2024-07-11 DIAGNOSIS — Z12.31 VISIT FOR SCREENING MAMMOGRAM: ICD-10-CM

## 2024-07-11 PROCEDURE — 77063 BREAST TOMOSYNTHESIS BI: CPT | Mod: 52

## 2025-07-17 ENCOUNTER — APPOINTMENT (OUTPATIENT)
Age: 70
Setting detail: DERMATOLOGY
End: 2025-07-17

## 2025-07-17 DIAGNOSIS — I78.1 NEVUS, NON-NEOPLASTIC: ICD-10-CM

## 2025-07-17 PROBLEM — D48.5 NEOPLASM OF UNCERTAIN BEHAVIOR OF SKIN: Status: ACTIVE | Noted: 2025-07-17

## 2025-07-17 PROCEDURE — ? ADDITIONAL NOTES

## 2025-07-17 PROCEDURE — ? BIOPSY BY SHAVE METHOD

## 2025-07-17 ASSESSMENT — LOCATION ZONE DERM: LOCATION ZONE: NOSE

## 2025-07-17 ASSESSMENT — LOCATION DETAILED DESCRIPTION DERM: LOCATION DETAILED: NASAL DORSUM

## 2025-07-17 ASSESSMENT — LOCATION SIMPLE DESCRIPTION DERM: LOCATION SIMPLE: NOSE

## 2025-07-17 NOTE — PROCEDURE: ADDITIONAL NOTES
Additional Notes: We discussed laser treatment at outside clinic or shave biopsy, patient would like to continue with a shave biopsy followed by electrocautery despite scarring potential.
Render Risk Assessment In Note?: no
Detail Level: Simple

## 2025-07-17 NOTE — HPI: SKIN LESION
What Type Of Note Output Would You Prefer (Optional)?: Bullet Format
How Severe Is Your Skin Lesion?: mild
Has Your Skin Lesion Been Treated?: been treated
Is This A New Presentation, Or A Follow-Up?: Skin Lesion
Additional History: Biopsy 20 years ago by outside clinic and patient reports pathology showed benign results

## 2025-08-05 ENCOUNTER — HOSPITAL ENCOUNTER (OUTPATIENT)
Dept: MAMMOGRAPHY | Facility: CLINIC | Age: 70
Discharge: HOME | End: 2025-08-05
Attending: FAMILY MEDICINE
Payer: COMMERCIAL

## 2025-08-05 DIAGNOSIS — Z12.31 VISIT FOR SCREENING MAMMOGRAM: ICD-10-CM

## 2025-08-05 PROCEDURE — 77063 BREAST TOMOSYNTHESIS BI: CPT | Mod: 52
